# Patient Record
Sex: FEMALE | Race: WHITE | Employment: FULL TIME | URBAN - METROPOLITAN AREA
[De-identification: names, ages, dates, MRNs, and addresses within clinical notes are randomized per-mention and may not be internally consistent; named-entity substitution may affect disease eponyms.]

---

## 2017-06-28 ENCOUNTER — OFFICE VISIT (OUTPATIENT)
Dept: FAMILY MEDICINE CLINIC | Age: 27
End: 2017-06-28
Payer: COMMERCIAL

## 2017-06-28 VITALS
DIASTOLIC BLOOD PRESSURE: 94 MMHG | SYSTOLIC BLOOD PRESSURE: 132 MMHG | HEIGHT: 62 IN | BODY MASS INDEX: 42.33 KG/M2 | HEART RATE: 87 BPM | WEIGHT: 230 LBS | TEMPERATURE: 98.7 F | OXYGEN SATURATION: 98 %

## 2017-06-28 DIAGNOSIS — Z13.9 SCREENING: ICD-10-CM

## 2017-06-28 DIAGNOSIS — E78.5 HYPERLIPIDEMIA, UNSPECIFIED HYPERLIPIDEMIA TYPE: Primary | ICD-10-CM

## 2017-06-28 DIAGNOSIS — K59.00 CONSTIPATION, UNSPECIFIED CONSTIPATION TYPE: ICD-10-CM

## 2017-06-28 DIAGNOSIS — F43.0 STRESS REACTION: ICD-10-CM

## 2017-06-28 PROCEDURE — 99214 OFFICE O/P EST MOD 30 MIN: CPT | Performed by: PHYSICIAN ASSISTANT

## 2017-06-28 ASSESSMENT — ENCOUNTER SYMPTOMS
ODYNOPHAGIA: 0
EYE ITCHING: 0
CHEST TIGHTNESS: 0
TENESMUS: 0
CONSTIPATION: 1
SINUS PRESSURE: 0
SORE THROAT: 0
ABDOMINAL PAIN: 0
EYE DISCHARGE: 0
VISUAL CHANGE: 0
NAUSEA: 0
VOMITING: 0
SHORTNESS OF BREATH: 0
RHINORRHEA: 0
BLOATING: 0
COUGH: 0
DIARRHEA: 0
BACK PAIN: 0

## 2017-06-29 ENCOUNTER — HOSPITAL ENCOUNTER (OUTPATIENT)
Facility: CLINIC | Age: 27
Discharge: HOME | End: 2017-06-29
Payer: COMMERCIAL

## 2017-06-29 DIAGNOSIS — K59.00 CONSTIPATION, UNSPECIFIED CONSTIPATION TYPE: ICD-10-CM

## 2017-06-29 DIAGNOSIS — Z13.9 SCREENING: ICD-10-CM

## 2017-06-29 DIAGNOSIS — F43.0 STRESS REACTION: ICD-10-CM

## 2017-06-29 DIAGNOSIS — E78.5 HYPERLIPIDEMIA, UNSPECIFIED HYPERLIPIDEMIA TYPE: ICD-10-CM

## 2017-06-29 LAB
ABSOLUTE EOS #: 0.1 K/UL (ref 0–0.4)
ABSOLUTE LYMPH #: 3.2 K/UL (ref 1–4.8)
ABSOLUTE MONO #: 0.5 K/UL (ref 0.1–1.2)
ALBUMIN SERPL-MCNC: 4.4 G/DL (ref 3.5–5.2)
ALBUMIN/GLOBULIN RATIO: 1.3 (ref 1–2.5)
ALP BLD-CCNC: 75 U/L (ref 35–104)
ALT SERPL-CCNC: 15 U/L (ref 5–33)
ANION GAP SERPL CALCULATED.3IONS-SCNC: 15 MMOL/L (ref 9–17)
AST SERPL-CCNC: 17 U/L
BASOPHILS # BLD: 0 %
BASOPHILS ABSOLUTE: 0 K/UL (ref 0–0.2)
BILIRUB SERPL-MCNC: 0.44 MG/DL (ref 0.3–1.2)
BUN BLDV-MCNC: 10 MG/DL (ref 6–20)
BUN/CREAT BLD: NORMAL (ref 9–20)
CALCIUM SERPL-MCNC: 9.5 MG/DL (ref 8.6–10.4)
CHLORIDE BLD-SCNC: 103 MMOL/L (ref 98–107)
CHOLESTEROL/HDL RATIO: 5.2
CHOLESTEROL: 199 MG/DL
CO2: 22 MMOL/L (ref 20–31)
CORTISOL COLLECTION INFO: NORMAL
CORTISOL: 13.2 UG/DL
CREAT SERPL-MCNC: 0.7 MG/DL (ref 0.5–0.9)
DIFFERENTIAL TYPE: NORMAL
EOSINOPHILS RELATIVE PERCENT: 1 %
GFR AFRICAN AMERICAN: >60 ML/MIN
GFR NON-AFRICAN AMERICAN: >60 ML/MIN
GFR SERPL CREATININE-BSD FRML MDRD: NORMAL ML/MIN/{1.73_M2}
GFR SERPL CREATININE-BSD FRML MDRD: NORMAL ML/MIN/{1.73_M2}
GLUCOSE BLD-MCNC: 90 MG/DL (ref 70–99)
HCT VFR BLD CALC: 39.3 % (ref 36–46)
HDLC SERPL-MCNC: 38 MG/DL
HEMOGLOBIN: 13.4 G/DL (ref 12–16)
INSULIN COMMENT: NORMAL
INSULIN REFERENCE RANGE:: NORMAL
INSULIN: 24.3 MU/L
LDL CHOLESTEROL: 132 MG/DL (ref 0–130)
LYMPHOCYTES # BLD: 35 %
MCH RBC QN AUTO: 30.6 PG (ref 26–34)
MCHC RBC AUTO-ENTMCNC: 34.2 G/DL (ref 31–37)
MCV RBC AUTO: 89.5 FL (ref 80–100)
MONOCYTES # BLD: 5 %
PDW BLD-RTO: 13.4 % (ref 12.5–15.4)
PLATELET # BLD: 327 K/UL (ref 140–450)
PLATELET ESTIMATE: NORMAL
PMV BLD AUTO: 8.8 FL (ref 6–12)
POTASSIUM SERPL-SCNC: 3.9 MMOL/L (ref 3.7–5.3)
RBC # BLD: 4.39 M/UL (ref 4–5.2)
RBC # BLD: NORMAL 10*6/UL
SEG NEUTROPHILS: 59 %
SEGMENTED NEUTROPHILS ABSOLUTE COUNT: 5.5 K/UL (ref 1.8–7.7)
SODIUM BLD-SCNC: 140 MMOL/L (ref 135–144)
TESTOSTERONE TOTAL: 69 NG/DL (ref 20–70)
TOTAL PROTEIN: 7.7 G/DL (ref 6.4–8.3)
TRIGL SERPL-MCNC: 145 MG/DL
TSH SERPL DL<=0.05 MIU/L-ACNC: 1.8 MIU/L (ref 0.3–5)
VITAMIN D 25-HYDROXY: 9.1 NG/ML (ref 30–100)
VLDLC SERPL CALC-MCNC: ABNORMAL MG/DL (ref 1–30)
WBC # BLD: 9.3 K/UL (ref 3.5–11)
WBC # BLD: NORMAL 10*3/UL

## 2017-06-29 PROCEDURE — 84443 ASSAY THYROID STIM HORMONE: CPT

## 2017-06-29 PROCEDURE — 83525 ASSAY OF INSULIN: CPT

## 2017-06-29 PROCEDURE — 85025 COMPLETE CBC W/AUTO DIFF WBC: CPT

## 2017-06-29 PROCEDURE — 84403 ASSAY OF TOTAL TESTOSTERONE: CPT

## 2017-06-29 PROCEDURE — 36415 COLL VENOUS BLD VENIPUNCTURE: CPT

## 2017-06-29 PROCEDURE — 82306 VITAMIN D 25 HYDROXY: CPT

## 2017-06-29 PROCEDURE — 82533 TOTAL CORTISOL: CPT

## 2017-06-29 PROCEDURE — 80053 COMPREHEN METABOLIC PANEL: CPT

## 2017-06-29 PROCEDURE — 80061 LIPID PANEL: CPT

## 2017-06-30 RX ORDER — LUBIPROSTONE 8 UG/1
8 CAPSULE, GELATIN COATED ORAL 2 TIMES DAILY WITH MEALS
Qty: 60 CAPSULE | Refills: 0 | Status: SHIPPED | OUTPATIENT
Start: 2017-06-30 | End: 2017-08-02 | Stop reason: RX

## 2017-08-02 ENCOUNTER — OFFICE VISIT (OUTPATIENT)
Dept: FAMILY MEDICINE CLINIC | Age: 27
End: 2017-08-02
Payer: COMMERCIAL

## 2017-08-02 VITALS
SYSTOLIC BLOOD PRESSURE: 124 MMHG | HEART RATE: 96 BPM | HEIGHT: 62 IN | WEIGHT: 226.6 LBS | BODY MASS INDEX: 41.7 KG/M2 | TEMPERATURE: 98.2 F | DIASTOLIC BLOOD PRESSURE: 82 MMHG | OXYGEN SATURATION: 98 %

## 2017-08-02 DIAGNOSIS — E78.5 HYPERLIPIDEMIA, UNSPECIFIED HYPERLIPIDEMIA TYPE: Primary | ICD-10-CM

## 2017-08-02 DIAGNOSIS — F43.9 STRESS: ICD-10-CM

## 2017-08-02 DIAGNOSIS — Z23 NEED FOR TETANUS BOOSTER: ICD-10-CM

## 2017-08-02 DIAGNOSIS — E55.9 VITAMIN D DEFICIENCY: ICD-10-CM

## 2017-08-02 PROCEDURE — 90471 IMMUNIZATION ADMIN: CPT | Performed by: PHYSICIAN ASSISTANT

## 2017-08-02 PROCEDURE — 90715 TDAP VACCINE 7 YRS/> IM: CPT | Performed by: PHYSICIAN ASSISTANT

## 2017-08-02 PROCEDURE — 99213 OFFICE O/P EST LOW 20 MIN: CPT | Performed by: PHYSICIAN ASSISTANT

## 2017-08-02 RX ORDER — CHOLECALCIFEROL (VITAMIN D3) 1250 MCG
50000 CAPSULE ORAL WEEKLY
Qty: 12 CAPSULE | Refills: 0 | Status: SHIPPED | OUTPATIENT
Start: 2017-08-02 | End: 2017-11-10 | Stop reason: SDUPTHER

## 2017-08-02 ASSESSMENT — ENCOUNTER SYMPTOMS
CONSTIPATION: 1
SORE THROAT: 0
VOMITING: 0
RHINORRHEA: 0
NAUSEA: 0
EYE ITCHING: 0
SHORTNESS OF BREATH: 0
ODYNOPHAGIA: 0
CHEST TIGHTNESS: 0
COUGH: 0
EYE DISCHARGE: 0
BACK PAIN: 0
TENESMUS: 0
SINUS PRESSURE: 0
VISUAL CHANGE: 0
ABDOMINAL PAIN: 0
BLOATING: 0
DIARRHEA: 0

## 2017-08-02 ASSESSMENT — PATIENT HEALTH QUESTIONNAIRE - PHQ9
2. FEELING DOWN, DEPRESSED OR HOPELESS: 0
1. LITTLE INTEREST OR PLEASURE IN DOING THINGS: 0
SUM OF ALL RESPONSES TO PHQ QUESTIONS 1-9: 0
SUM OF ALL RESPONSES TO PHQ9 QUESTIONS 1 & 2: 0

## 2017-11-10 ENCOUNTER — TELEPHONE (OUTPATIENT)
Dept: FAMILY MEDICINE CLINIC | Age: 27
End: 2017-11-10

## 2017-11-10 RX ORDER — CHOLECALCIFEROL (VITAMIN D3) 1250 MCG
50000 CAPSULE ORAL WEEKLY
Qty: 12 CAPSULE | Refills: 0 | Status: SHIPPED | OUTPATIENT
Start: 2017-11-10 | End: 2017-11-22 | Stop reason: SDUPTHER

## 2017-11-22 ENCOUNTER — OFFICE VISIT (OUTPATIENT)
Dept: FAMILY MEDICINE CLINIC | Age: 27
End: 2017-11-22
Payer: COMMERCIAL

## 2017-11-22 VITALS
BODY MASS INDEX: 43.84 KG/M2 | DIASTOLIC BLOOD PRESSURE: 80 MMHG | TEMPERATURE: 98.2 F | HEART RATE: 80 BPM | OXYGEN SATURATION: 98 % | HEIGHT: 62 IN | SYSTOLIC BLOOD PRESSURE: 122 MMHG | WEIGHT: 238.2 LBS

## 2017-11-22 DIAGNOSIS — Z12.4 SCREENING FOR CERVICAL CANCER: ICD-10-CM

## 2017-11-22 DIAGNOSIS — Z11.4 ENCOUNTER FOR SCREENING FOR HIV: ICD-10-CM

## 2017-11-22 DIAGNOSIS — F43.0 STRESS REACTION: ICD-10-CM

## 2017-11-22 DIAGNOSIS — E78.5 HYPERLIPIDEMIA, UNSPECIFIED HYPERLIPIDEMIA TYPE: Primary | ICD-10-CM

## 2017-11-22 DIAGNOSIS — R63.5 WEIGHT GAIN: ICD-10-CM

## 2017-11-22 DIAGNOSIS — E55.9 VITAMIN D DEFICIENCY: ICD-10-CM

## 2017-11-22 PROCEDURE — 99214 OFFICE O/P EST MOD 30 MIN: CPT | Performed by: PHYSICIAN ASSISTANT

## 2017-11-22 RX ORDER — CHOLECALCIFEROL (VITAMIN D3) 1250 MCG
50000 CAPSULE ORAL WEEKLY
Qty: 12 CAPSULE | Refills: 0 | Status: SHIPPED | OUTPATIENT
Start: 2017-11-22

## 2017-11-22 ASSESSMENT — ENCOUNTER SYMPTOMS
VISUAL CHANGE: 0
BACK PAIN: 0
SINUS PRESSURE: 0
ABDOMINAL PAIN: 0
TENESMUS: 0
NAUSEA: 0
SHORTNESS OF BREATH: 0
CHEST TIGHTNESS: 0
EYE ITCHING: 0
DIARRHEA: 0
ODYNOPHAGIA: 0
COUGH: 0
RHINORRHEA: 0
EYE DISCHARGE: 0
BLOATING: 0
SORE THROAT: 0
CONSTIPATION: 1
VOMITING: 0

## 2017-11-22 ASSESSMENT — PATIENT HEALTH QUESTIONNAIRE - PHQ9
SUM OF ALL RESPONSES TO PHQ9 QUESTIONS 1 & 2: 0
2. FEELING DOWN, DEPRESSED OR HOPELESS: 0
SUM OF ALL RESPONSES TO PHQ QUESTIONS 1-9: 0
1. LITTLE INTEREST OR PLEASURE IN DOING THINGS: 0

## 2017-11-22 NOTE — PROGRESS NOTES
[address]  [address] [address]  Dept: [phone]  Dept Fax: [phone]    Blake Traylor is a 24 y.o. female who presents today for her medical conditions/complaints as noted below. Blake Traylor is c/o of   Chief Complaint   Patient presents with   [employer]     8/2017    Medication Refill     Have you seen any other physician or provider since your last visit no    Have you had any other diagnostic tests since your last visit? no    Have you changed or stopped any medications since your last visit including any over-the-counter medicines, vitamins, or herbal medicines? yes - nystatin cream therapy is complete     Are you taking all your prescribed medications? No  If NO, why? -  No prescription meds at this time           Patient Self-Management Goal for this visit. What is your goal for your visit today? - check up for obesity to discuss medication for weight loss   Barriers to success: none   Plan for overcoming my barriers: N/A      Confidence: 10/10   Date goal set: 2/19/16   Date expected to reach goal: 1day    Medical history Review  Past Medical, Family, and Social History reviewed and does contribute to the patient presenting condition    Health Maintenance Due   Topic Date Due    HIV screen  05/26/2007    Cervical cancer screen  05/26/2013         HPI:     Patient is here to discuss weight loss medication. Patient also wishes to discuss the process for mole biopsies; otherwise denies any other complaints or concerns. Hyperlipidemia   This is a chronic problem. The problem is controlled. Exacerbating diseases include obesity. She has no history of chronic renal disease, diabetes, hypothyroidism, liver disease or nephrotic syndrome. Pertinent negatives include no chest pain, focal sensory loss, focal weakness, leg pain, myalgias or shortness of breath. The current treatment provides no improvement of lipids.    GI 216 lb 11.2 oz (98.294 kg)  BMI 39.62 kg/m2  SpO2 97%  LMP 01/22/2016 (Approximate)     HENT:   Head: Normocephalic and atraumatic. Right Ear: External ear normal.   Left Ear: External ear normal.   Nose: Nose normal.   Mouth/Throat: Oropharynx is clear and moist.   Eyes: Conjunctivae and EOM are normal. Pupils are equal, round, and reactive to light. Right eye exhibits no discharge. Left eye exhibits no discharge. No scleral icterus. Neck: Normal range of motion. Neck supple. No tracheal deviation present. No thyromegaly present. Cardiovascular: Normal rate, regular rhythm and normal heart sounds. Exam reveals no gallop and no friction rub. No murmur heard. Pulmonary/Chest: Effort normal and breath sounds normal. No stridor. No respiratory distress. She has no wheezes. She has no rales. She exhibits no tenderness. Abdominal: Soft. Bowel sounds are normal. She exhibits no distension. There is no tenderness. There is no rebound and no guarding. Musculoskeletal: She exhibits no edema. Neurological: She is alert and oriented to person, place, and time. Gait normal.   Skin: Skin is warm and dry. No rash noted. She is not diaphoretic. Psychiatric: She has a normal mood and affect. Her affect is not inappropriate. Nursing note and vitals reviewed. /80   Pulse 80   Temp 98.2 °F (36.8 °C) (Oral)   Ht 5' 2\" (1.575 m)   Wt 238 lb 3.2 oz (108 kg)   LMP 11/20/2017 (Exact Date)   SpO2 98%   Breastfeeding? No   BMI 43.57 kg/m²     Assessment:      1. Hyperlipidemia, unspecified hyperlipidemia type     2. Encounter for screening for HIV  HIV Screen   3. Vitamin D deficiency     4. BMI 40.0-44.9, adult ([address]     5. Weight gain     6. Stress reaction     7. Screening for cervical cancer  [address], [place], [address]:      No Follow-up on file.     Orders Placed This Encounter   Procedures    HIV Screen     Standing Status:   Future     Standing Expiration Date:   2018   [address] [place], [address]*     Referral Priority:   Routine     Referral Type:   Consult for Advice and Opinion     Referral Reason:   Specialty Services Required     Referred to Provider:   Darrius Luevano MD     Requested Specialty:   Obstetrics & Gynecology     Number of Visits Requested:   1     Orders Placed This Encounter   Medications    Cholecalciferol (VITAMIN D3) 67082 units CAPS     Sig: Take 50,000 Units by mouth once a week     Dispense:  12 capsule     Refill:  0      Stress reaction - Under a lot of stress at work, getting  . NO SI or HI. Start zoloft. Pt tolerating well. Cont med. Obesity - Diet and exercise encouraged. Previously on adipex. Has gained all wt back and more. Needs to get down to 206 to consider restart. HLD - Diet and exercise encouraged. Will repeat labs. Vit D def - Replace and recheck 12 wks. Constipation - Infrequent BM - once every 8 days. Tried OTC meds without relief. Family planning - Taking OCP. Tolerating well, but would like to stop. . Risks of OCP. No family hx DM, HLD, DM. No ETOH or tobacco abuse. Diet and exercise encouraged. MV and vit D encouraged. Dentist q 6 mo. Vision yearly. Patient given educational materials - see patient instructions. Discussed use, benefit, and side effects of prescribed medications. All patient questions answered. Pt voiced understanding. Reviewed health maintenance. Instructed to continue current medications, diet and exercise. Patient agreed with treatment plan. Follow up as directed.      Electronically signed by Paolo Segovia PA-C on 2017 at 2:05 PM

## 2018-01-11 ENCOUNTER — OFFICE VISIT (OUTPATIENT)
Dept: OBGYN CLINIC | Age: 28
End: 2018-01-11
Payer: COMMERCIAL

## 2018-01-11 VITALS
OXYGEN SATURATION: 96 % | BODY MASS INDEX: 44.16 KG/M2 | DIASTOLIC BLOOD PRESSURE: 70 MMHG | HEART RATE: 80 BPM | HEIGHT: 62 IN | RESPIRATION RATE: 12 BRPM | SYSTOLIC BLOOD PRESSURE: 118 MMHG | WEIGHT: 240 LBS

## 2018-01-11 DIAGNOSIS — R63.5 WEIGHT GAIN: ICD-10-CM

## 2018-01-11 DIAGNOSIS — Z01.419 WELL WOMAN EXAM: Primary | ICD-10-CM

## 2018-01-11 DIAGNOSIS — Z78.9 NON-SMOKER: ICD-10-CM

## 2018-01-11 PROCEDURE — 99385 PREV VISIT NEW AGE 18-39: CPT | Performed by: NURSE PRACTITIONER

## 2018-01-11 ASSESSMENT — ENCOUNTER SYMPTOMS
EYES NEGATIVE: 1
RESPIRATORY NEGATIVE: 1
GASTROINTESTINAL NEGATIVE: 1

## 2018-01-11 ASSESSMENT — PATIENT HEALTH QUESTIONNAIRE - PHQ9
SUM OF ALL RESPONSES TO PHQ9 QUESTIONS 1 & 2: 0
SUM OF ALL RESPONSES TO PHQ QUESTIONS 1-9: 0
1. LITTLE INTEREST OR PLEASURE IN DOING THINGS: 0
2. FEELING DOWN, DEPRESSED OR HOPELESS: 0

## 2018-01-11 NOTE — PATIENT INSTRUCTIONS
Patient Education        Well Visit, Ages 25 to 48: Care Instructions  Your Care Instructions    Physical exams can help you stay healthy. Your doctor has checked your overall health and may have suggested ways to take good care of yourself. He or she also may have recommended tests. At home, you can help prevent illness with healthy eating, regular exercise, and other steps. Follow-up care is a key part of your treatment and safety. Be sure to make and go to all appointments, and call your doctor if you are having problems. It's also a good idea to know your test results and keep a list of the medicines you take. How can you care for yourself at home? · Reach and stay at a healthy weight. This will lower your risk for many problems, such as obesity, diabetes, heart disease, and high blood pressure. · Get at least 30 minutes of physical activity on most days of the week. Walking is a good choice. You also may want to do other activities, such as running, swimming, cycling, or playing tennis or team sports. Discuss any changes in your exercise program with your doctor. · Do not smoke or allow others to smoke around you. If you need help quitting, talk to your doctor about stop-smoking programs and medicines. These can increase your chances of quitting for good. · Talk to your doctor about whether you have any risk factors for sexually transmitted infections (STIs). Having one sex partner (who does not have STIs and does not have sex with anyone else) is a good way to avoid these infections. · Use birth control if you do not want to have children at this time. Talk with your doctor about the choices available and what might be best for you. · Protect your skin from too much sun. When you're outdoors from 10 a.m. to 4 p.m., stay in the shade or cover up with clothing and a hat with a wide brim. Wear sunglasses that block UV rays.  Even when it's cloudy, put broad-spectrum sunscreen (SPF 30 or higher) on any exposed skin. · See a dentist one or two times a year for checkups and to have your teeth cleaned. · Wear a seat belt in the car. · Drink alcohol in moderation, if at all. That means no more than 2 drinks a day for men and 1 drink a day for women. Follow your doctor's advice about when to have certain tests. These tests can spot problems early. For everyone  · Cholesterol. Have the fat (cholesterol) in your blood tested after age 21. Your doctor will tell you how often to have this done based on your age, family history, or other things that can increase your risk for heart disease. · Blood pressure. Have your blood pressure checked during a routine doctor visit. Your doctor will tell you how often to check your blood pressure based on your age, your blood pressure results, and other factors. · Vision. Talk with your doctor about how often to have a glaucoma test.  · Diabetes. Ask your doctor whether you should have tests for diabetes. · Colon cancer. Have a test for colon cancer at age 48. You may have one of several tests. If you are younger than 48, you may need a test earlier if you have any risk factors. Risk factors include whether you already had a precancerous polyp removed from your colon or whether your parent, brother, sister, or child has had colon cancer. For women  · Breast exam and mammogram. Talk to your doctor about when you should have a clinical breast exam and a mammogram. Medical experts differ on whether and how often women under 50 should have these tests. Your doctor can help you decide what is right for you. · Pap test and pelvic exam. Begin Pap tests at age 24. A Pap test is the best way to find cervical cancer. The test often is part of a pelvic exam. Ask how often to have this test.  · Tests for sexually transmitted infections (STIs). Ask whether you should have tests for STIs.  You may be at risk if you have sex with more than one person, especially if your partners do not wear in the normal range, it means that you have a lower risk for weight-related health problems. If your BMI is in the overweight or obese range, you may be at increased risk for weight-related health problems, such as high blood pressure, heart disease, stroke, arthritis or joint pain, and diabetes. If your BMI is in the underweight range, you may be at increased risk for health problems such as fatigue, lower protection (immunity) against illness, muscle loss, bone loss, hair loss, and hormone problems. BMI is just one measure of your risk for weight-related health problems. You may be at higher risk for health problems if you are not active, you eat an unhealthy diet, or you drink too much alcohol or use tobacco products. Follow-up care is a key part of your treatment and safety. Be sure to make and go to all appointments, and call your doctor if you are having problems. It's also a good idea to know your test results and keep a list of the medicines you take. How can you care for yourself at home? · Practice healthy eating habits. This includes eating plenty of fruits, vegetables, whole grains, lean protein, and low-fat dairy. · If your doctor recommends it, get more exercise. Walking is a good choice. Bit by bit, increase the amount you walk every day. Try for at least 30 minutes on most days of the week. · Do not smoke. Smoking can increase your risk for health problems. If you need help quitting, talk to your doctor about stop-smoking programs and medicines. These can increase your chances of quitting for good. · Limit alcohol to 2 drinks a day for men and 1 drink a day for women. Too much alcohol can cause health problems. If you have a BMI higher than 25  · Your doctor may do other tests to check your risk for weight-related health problems.  This may include measuring the distance around your waist. A waist measurement of more than 40 inches in men or 35 inches in women can increase the risk of

## 2018-01-11 NOTE — PROGRESS NOTES
baby  STD counseling and prevention reviewed. Denies need for swabs  Routine health maintenance per patients PCP.     Bernard Goodell, NP  [facility]

## 2025-08-13 ENCOUNTER — OFFICE VISIT (OUTPATIENT)
Dept: OBGYN CLINIC | Age: 35
End: 2025-08-13
Payer: COMMERCIAL

## 2025-08-13 ENCOUNTER — HOSPITAL ENCOUNTER (OUTPATIENT)
Age: 35
Setting detail: SPECIMEN
Discharge: HOME | End: 2025-08-13

## 2025-08-13 VITALS
HEIGHT: 62 IN | SYSTOLIC BLOOD PRESSURE: 130 MMHG | BODY MASS INDEX: 46.93 KG/M2 | DIASTOLIC BLOOD PRESSURE: 85 MMHG | HEART RATE: 100 BPM | WEIGHT: 255 LBS

## 2025-08-13 DIAGNOSIS — Z76.89 ENCOUNTER TO ESTABLISH CARE WITH NEW DOCTOR: ICD-10-CM

## 2025-08-13 DIAGNOSIS — Z01.419 ENCOUNTER FOR WELL WOMAN EXAM WITH ROUTINE GYNECOLOGICAL EXAM: Primary | ICD-10-CM

## 2025-08-13 DIAGNOSIS — N92.6 IRREGULAR PERIODS: ICD-10-CM

## 2025-08-13 LAB
CHOLESTEROL, TOTAL: 258 MG/DL
CHOLESTEROL/HDL RATIO: 7.8
ESTIMATED AVERAGE GLUCOSE: 105
HBA1C MFR BLD: 5.3 %
HDLC SERPL-MCNC: 33 MG/DL (ref 35–70)
LDL CHOLESTEROL: 176
NONHDLC SERPL-MCNC: ABNORMAL MG/DL
PROLACTIN: 6.8
SEX HORMONE BINDING GLOBULIN: 29.5
TESTOSTERONE FREE: 10.9
TESTOSTERONE, FEMALES/CHILDREN: 93
TRIGL SERPL-MCNC: 244 MG/DL
VLDLC SERPL CALC-MCNC: 49 MG/DL

## 2025-08-13 PROCEDURE — 99385 PREV VISIT NEW AGE 18-39: CPT | Performed by: OBSTETRICS & GYNECOLOGY

## 2025-08-13 PROCEDURE — 99459 PELVIC EXAMINATION: CPT | Performed by: OBSTETRICS & GYNECOLOGY

## 2025-08-13 ASSESSMENT — ENCOUNTER SYMPTOMS
BACK PAIN: 0
COUGH: 0
ABDOMINAL PAIN: 0
SHORTNESS OF BREATH: 0

## 2025-08-15 ENCOUNTER — RESULTS FOLLOW-UP (OUTPATIENT)
Dept: OBGYN CLINIC | Age: 35
End: 2025-08-15

## 2025-08-15 DIAGNOSIS — E28.2 PCOS (POLYCYSTIC OVARIAN SYNDROME): Primary | ICD-10-CM

## 2025-08-15 LAB
CHOLESTEROL, TOTAL: 258 MG/DL
CHOLESTEROL/HDL RATIO: 7.8
ESTIMATED AVERAGE GLUCOSE: 105 MG/DL
ESTRADIOL LEVEL: 41.8 PG/ML
FOLLICLE STIMULATING HORMONE: 4.94 MIU/ML
HBA1C MFR BLD: 5.3 %
HDLC SERPL-MCNC: 33 MG/DL
HPV I/H RISK 4 DNA CVX QL NAA+PROBE: NOT DETECTED
HPV SAMPLE: NORMAL
HPV, INTERPRETATION: NORMAL
HPV16 DNA CVX QL NAA+PROBE: NOT DETECTED
HPV18 DNA CVX QL NAA+PROBE: NOT DETECTED
LDL CHOLESTEROL: 176 MG/DL
LUTEINIZING HORMONE: 5.78 MIU/ML
PROLACTIN: 6.8 NG/ML
SEX HORMONE BINDING GLOBULIN: 29.5 NMOL/L
SPECIMEN DESCRIPTION: NORMAL
TESTOSTERONE TOTAL: 93 NG/DL
TESTOSTERONE, FREE INDEX: 10.9 %
TRIGL SERPL-MCNC: 244 MG/DL
VLDLC SERPL CALC-MCNC: 49 MG/DL

## 2025-08-21 DIAGNOSIS — N92.6 IRREGULAR PERIODS: ICD-10-CM

## 2025-08-22 PROBLEM — E28.2 PCOS (POLYCYSTIC OVARIAN SYNDROME): Status: ACTIVE | Noted: 2025-08-22

## 2025-08-22 RX ORDER — DROSPIRENONE AND ETHINYL ESTRADIOL 0.03MG-3MG
1 KIT ORAL DAILY
Qty: 1 PACKET | Refills: 5 | Status: SHIPPED | OUTPATIENT
Start: 2025-08-22

## 2025-08-29 LAB — CYTOLOGY REPORT: NORMAL
